# Patient Record
(demographics unavailable — no encounter records)

---

## 2025-05-20 NOTE — HISTORY OF PRESENT ILLNESS
[Parent] : parent [FreeTextEntry1] : CPE  [de-identified] : 46F wit history of morbid obesity and HDL presents to clinic with mother for CPE.   Per chart review, last CPE  and bariatric surgery clinic appointment . Arrived to office 130/84, BMI 60.   Patient reports that in 2024 had "pneumonia" and went to a physician and prescribed amoxicillin, albuterol inhaler, and benzonotate. Since then, patient month long cough got better but has residual intermittent cough and nasal congestion with yellowish phelgm throughout the day. Otherwise denies SOB, headache, facial tenderness. Patient reports other issue is weight loss and reports not wanting to have surgery. Reports to eat well balanced meals home cooked without extra sugary foods; doesn't have large portion size. Per mom, fhx on father's size is obesity. Patient's daughter has childhood obesity as well. Denies known hx thyroid illness. Endorses constipation with fluctuation of hard pellet stool and normal soft. 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 However currently without life stressors. Denies arthralgia, sleeping issues, snoring, intermittent acid reflux.   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

## 2025-05-20 NOTE — PHYSICAL EXAM
[de-identified] : NAD, calm, cooperative, morbid obese habitus with central adiposity  [de-identified] : TANO vel[place]co/l, EOMI, nonconjunctival pallor  [de-identified] : moist mucous membranes, enlarged tonsils without erythema/edema,  submental fullness, dry nasal turbinates  [de-identified] : [place] hump  [de-identified] : CTA b/l, nonlabored breathing  [de-identified] : S1/S2, RRR  [de-identified] : radial 2+ b/l, dorsalis pedis 2+ b/l  [de-identified] : soft, nonTTP, nondistended  [de-identified] : increased adiposity UE and LE b/l, no pretibial edema  [de-identified] : no lesions  [de-identified] : AAOx3, normal gait, 5/5 shoulder abduction/adduction b/l, knee flexion/extension b/l  [de-identified] : full range affect, mutual topic, linear and goal directed thought process

## 2025-05-20 NOTE — PHYSICAL EXAM
[de-identified] : NAD, calm, cooperative, morbid obese habitus with central adiposity  [de-identified] : TANO [place]/l, EOMI, nonconjunctival pallor  [de-identified] : moist mucous membranes, enlarged tonsils without erythema/edema,  submental fullness, dry nasal turbinates  [de-identified] : [place] hump  [de-identified] : CTA b/l, nonlabored breathing  [de-identified] : S1/S2, RRR  [de-identified] : radial 2+ b/l, dorsalis pedis 2+ b/l  [de-identified] : soft, nonTTP, nondistended  [de-identified] : increased adiposity UE and LE b/l, no pretibial edema  [de-identified] : no lesions  [de-identified] : AAOx3, normal gait, 5/5 shoulder abduction/adduction b/l, knee flexion/extension b/l  [de-identified] : full range affect, mutual topic, linear and goal directed thought process

## 2025-05-20 NOTE — HEALTH RISK ASSESSMENT
[0] : 2) Feeling down, depressed, or hopeless: Not at all (0) [PHQ-2 Negative - No further assessment needed] : PHQ-2 Negative - No further assessment needed [[id number]] : 0

## 2025-05-20 NOTE — HISTORY OF PRESENT ILLNESS
[Parent] : parent [FreeTextEntry1] : CPE  [de-identified] : 46F wit history of morbid obesity and HDL presents to clinic with mother for CPE.   Per chart review, last CPE  and bariatric surgery clinic appointment . Arrived to office 130/84, BMI 60.   Patient reports that in 2024 had "pneumonia" and went to a physician and prescribed amoxicillin, albuterol inhaler, and benzonotate. Since then, patient month long cough got better but has residual intermittent cough and nasal congestion with yellowish phelgm throughout the day. Otherwise denies SOB, headache, facial tenderness. Patient reports other issue is weight loss and reports not wanting to have surgery. Reports to eat well balanced meals home cooked without extra sugary foods; doesn't have large portion size. Per mom, fhx on father's size is obesity. Patient's daughter has childhood obesity as well. Denies known hx thyroid illness. Endorses constipation with fluctuation of hard pellet stool and normal soft. 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 However currently without life stressors. Denies arthralgia, sleeping issues, snoring, intermittent acid reflux.   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

## 2025-05-20 NOTE — ASSESSMENT
[Vaccines Reviewed] : Immunizations reviewed today. Please see immunization details in the vaccine log within the immunization flowsheet.  [FreeTextEntry1] : 46F with history of morbid obesity and HLD here for CPE and subacute rhinosinuistis with recurrent L sided epistaxis.   #Subacute rhinosinusitis- 2/2 postnasal drip vs allergic  #Recurrent L sided epistaxis - 2/2 avm vs recurrent nasal trauma  Patient with respiratory tract infection few months prior with persistent nasal congestion and phelgm with intermittent cough. Frequent increase in L epistaxis that are non-prolonged. Enlarged tonsils without acute signs of inflammation. No active S&S infection. Dry nasal turbinates. Monitor.  -OTC fluticasone propionate 2 sprays per nostril BID x 6 weeks  -OTC guaifenesin for antitussive and mucus clearance  -Referral ENT for L sided  epistaxis recurrence   #Morbid obesity- 2/2 thyroid disorder vs hypercortisolemia vs unclear genetic related obesity  BMI 60. Past childhood obesity and FHx obesity with unlikely dietary/physical activity contributions. Has attempted weight loss in past without success. Central adiposity. Does not desire surgical intervention. No complications of associated morbid obesity.  -Referral to weight loss clinic  -F/u cortisol 8am serum to r/o hypercortisol -F/u TSH to r/o hypothyroid   #HLD  -C/w simvastatin 20 mg po qd   #HCM  -2025 Tdap; next due 2035  -F/u CBC, CMP, lipid profile, Hba1c  -Made aware to make OBGYN for pap smear/HPV cervical cancer screening  -Ordered 2025 mammogram

## 2025-05-20 NOTE — HEALTH RISK ASSESSMENT
[0] : 2) Feeling down, depressed, or hopeless: Not at all (0) [PHQ-2 Negative - No further assessment needed] : PHQ-2 Negative - No further assessment needed [[id number]] : 0